# Patient Record
Sex: MALE | Race: WHITE | NOT HISPANIC OR LATINO | Employment: FULL TIME | URBAN - METROPOLITAN AREA
[De-identification: names, ages, dates, MRNs, and addresses within clinical notes are randomized per-mention and may not be internally consistent; named-entity substitution may affect disease eponyms.]

---

## 2017-02-20 ENCOUNTER — HOSPITAL ENCOUNTER (OUTPATIENT)
Dept: SLEEP MEDICINE | Facility: HOSPITAL | Age: 42
Discharge: HOME | End: 2017-02-20
Attending: INTERNAL MEDICINE | Admitting: INTERNAL MEDICINE

## 2017-02-20 PROCEDURE — G0463 HOSPITAL OUTPT CLINIC VISIT: HCPCS

## 2017-02-20 NOTE — PROGRESS NOTES
Sleep Disorder Follow Up    Patient Name: Ej Oscar Jr.  Age/Sex: 41 y.o. male  : 1975  MRN: [id number]    Date of Encounter Visit: 17  Referring Provider: Nate Larson Jr., MD  Place of Service: [facility]  Patient Care Team:  Nate Larson Jr., MD as PCP - General  Nate Larson Jr., MD as PCP - Family Medicine    PROBLEM LIST:  1. Severe TASHA on CPAP  2. Hyertension  3. Excessive daytime sleepiness  4. Morbid obesity  5. S/p hip replacement    History of Present Illness:  Ej Oscar Jr. is a 41 y.o. male who was last seen in the office on 16. He got new supplies after last visit and reports no complaints with machine or supplies.   The patient had a sleep study done prior that showed severe sleep apnea with an AHI of 50.8.   Patient is on CPAP and uses it most every night with no complaints from the mask or the pressure.  Patient sleeps better and has a deeper sleep with the CPAP and feels more energy during the day time.  Current CPAP setting 10 cmH20  ESS 4  Compliance download was reviewed and documented below  Weight is down 1 lb since last visit   Other comorbidities include HTN and obesity  Since last visit she has had a hip replacement on the right side in 2016. He was previously non-compliant with CPAP and mask. His blood pressure was elevated on today's visit, but reports his SBP is usually 120-130's at home. Re-check of blood pressure showed decrease to 150/80.     Review of Systems:     A ten-system review was conducted and was otherwise negative.   Please refer to the follow up sleep questionnaire page one for details.    Past Medical History:  Past Medical History   Diagnosis Date   • Arthritis    • Heartburn    • History of kidney stones    • Rash      LEFT CALF - TREATED BY PCP, USING TOPICAL MEDS DAILY   • Sleep apnea      WEARS CPAP       Past Surgical History   Procedure Laterality Date   • Pilonidal cystectomy     • Total  hip arthroplasty Right 11/18/2016     Procedure: RIGHT TOTAL HIP ARTHROPLASTY;  Surgeon: Debbie Caro MD;  Location: [facility] OR;  Service:        Home Medications:     Current Outpatient Prescriptions:   •  B Complex Vitamins (VITAMIN B COMPLEX) capsule capsule, Take 1 capsule by mouth Daily., Disp: , Rfl:   •  cetirizine (ZyrTEC) 10 MG tablet, Take 10 mg by mouth Every Morning., Disp: , Rfl:   •  Clotrimazole-Betamethasone (LOTRISONE EX), Apply  topically As Needed. LEFT CALF, Disp: , Rfl:   •  FIBER PO, Take 1 tablet by mouth Every Morning., Disp: , Rfl:   •  glucosamine-chondroitin 500-400 MG capsule capsule, Take 2 capsules by mouth Every Morning., Disp: , Rfl:   •  montelukast (SINGULAIR) 10 MG tablet, Take 10 mg by mouth Every Night., Disp: , Rfl:   •  Multiple Vitamins-Minerals (MULTIVITAMIN PO), Take 1 tablet by mouth daily., Disp: , Rfl:   •  POTASSIUM CHLORIDE ER PO, Take  by mouth Every Morning., Disp: , Rfl:     Allergies:  No Known Allergies    Past Social History:  Social History     Social History   • Marital status:      Spouse name: N/A   • Number of children: N/A   • Years of education: N/A     Social History Main Topics   • Smoking status: Former Smoker     Years: 1.00     Types: Cigars     Quit date: 2006   • Smokeless tobacco: Not on file   • Alcohol use Yes      Comment: RARE   • Drug use: No   • Sexual activity: Not on file     Other Topics Concern   • Not on file     Social History Narrative       Past Family History:  Family History   Problem Relation Age of Onset   • Heart disease Mother    • Hypertension Mother    • Depression Mother    • Hypertension Father    • Depression Brother    • Heart disease Maternal Grandmother    • Hypertension Maternal Grandmother    • Diabetes Maternal Grandmother    • Thyroid disease Maternal Grandmother    • Heart disease Maternal Grandfather    • Hypertension Maternal Grandfather    • Cancer Maternal Grandfather    • Hypertension  Paternal Grandmother    • Hypertension Paternal Grandfather    • Alcohol abuse Other      multple on both sides         Objective:   Done and documented on sleep disorders center physical examination sheet   VS: Ht: 73 inches, Wt: 366 lbs, BMI 48, /81 and recheck showed /80, HR 80, Neck size 21.75 inches    Physical Exam:   General: AAOx3. Normal mood and affect.   HEENT:  Mallampati 4 airways. Normal tongue and uvula. Normal tonsils.  LUNGS: Non-labored breathing. CTAB  HEART: regular rate and rhythm. No murmur. Normal s1/s2  EXTREMITIES: 1+ BLE edema. No cyanosis or clubbing. Normal gait    Diagnostic Data:  Sleep study- AHI 50.8    Compliance download showed 90% compliance with 6 hours and 6 minutes per night. Residual AHI of 2.2 on the CPAP at 10  with mean of 0.3 seconds of average air leak and 95% air leak of 3.9.     Assessment and Plan:   1. Severe TASHA- well controlled on CPAP with good control  2. HTN- not on any medications and noted as normal at prior visit. May be white coat syndrome  3. EDS that is better on CPAP  4. Morbid obesity with no sigificant changes  5. S/p R hip replacement       Recommendations:   Continue with CPAP at 10 cmH2o  Monitor blood pressure and follow up with PCP if continues to be elevated  Patient was educated in depth about TASHA and cardiovascular consequences if left untreated, including but not limited to CHF, CAD, arrhythmias, CVA, and/or HTN. Education also provided about the diagnostic tools for TASHA including the polysomnography and the treatment modalities including the CPAP. Adherence to the CPAP is a key factor in successful treatment of TASHA and the patient was encouraged to contact us in case of problem with the CPAP or the mask that can limit the tolerance of the compliance with the therapy.    Follow up in 1 year    MARY KATE Rios  [facility]   02/20/17  3:24 PM    EMR Dragon/Transcription disclaimer:   Much of this encounter note is  an electronic transcription/translation of spoken language to printed text. The electronic translation of spoken language may permit erroneous, or at times, nonsensical words or phrases to be inadvertently transcribed; Although I have reviewed the note for such errors, some may still exist.

## 2017-10-30 ENCOUNTER — OFFICE VISIT (OUTPATIENT)
Dept: FAMILY MEDICINE CLINIC | Facility: CLINIC | Age: 42
End: 2017-10-30

## 2017-10-30 VITALS
HEART RATE: 95 BPM | BODY MASS INDEX: 41.75 KG/M2 | HEIGHT: 73 IN | DIASTOLIC BLOOD PRESSURE: 80 MMHG | SYSTOLIC BLOOD PRESSURE: 150 MMHG | TEMPERATURE: 98.7 F | WEIGHT: 315 LBS | OXYGEN SATURATION: 98 %

## 2017-10-30 DIAGNOSIS — J30.1 NON-SEASONAL ALLERGIC RHINITIS DUE TO POLLEN, UNSPECIFIED CHRONICITY: Primary | ICD-10-CM

## 2017-10-30 PROCEDURE — 99213 OFFICE O/P EST LOW 20 MIN: CPT | Performed by: INTERNAL MEDICINE

## 2017-10-30 RX ORDER — MONTELUKAST SODIUM 10 MG/1
10 TABLET ORAL NIGHTLY
Qty: 90 TABLET | Refills: 3 | Status: SHIPPED | OUTPATIENT
Start: 2017-10-30 | End: 2018-11-05 | Stop reason: SDUPTHER

## 2017-10-30 NOTE — PROGRESS NOTES
Subjective   Ej Oscar Jr. is a 42 y.o. male.   Patient with drainage from throat most pronounced when he uses CPAP at night requests refill on his Singulair which is very effective  History of Present Illness   No other complaints this point in time significant seeing where refilled no other allergic/respiratory type complaints.  Patient is trying since of is doing better since he has hip surgery done.    Review of Systems   All other systems reviewed and are negative.      Objective   Vitals:    10/30/17 1515   BP: 150/80   Pulse: 95   Temp: 98.7 °F (37.1 °C)   SpO2: 98%   Weight: (!) 357 lb (162 kg)     Physical Exam   Constitutional: He appears well-developed and well-nourished.   Cardiovascular: Normal rate and regular rhythm.    Pulmonary/Chest: Effort normal and breath sounds normal.   Nursing note and vitals reviewed.      Lab Results   Component Value Date    INR 0.98 10/25/2016       Procedures    Assessment/Plan   Allergic rhinitis plan Singulair No. 90 with 3 refills, follow-up in one year and as needed    Much of this encounter note is an electronic transcription/translation of spoken language to printed text.  The electronic translation of spoken language may permit erroneous, or at times, nonsensical words or phrases to be inadvertently transcribed.  Although I have reviewed the note for such errors, some may still exist.

## 2018-02-19 ENCOUNTER — OFFICE VISIT (OUTPATIENT)
Dept: SLEEP MEDICINE | Facility: HOSPITAL | Age: 43
End: 2018-02-19
Attending: INTERNAL MEDICINE

## 2018-02-19 VITALS
WEIGHT: 315 LBS | BODY MASS INDEX: 44.1 KG/M2 | SYSTOLIC BLOOD PRESSURE: 158 MMHG | HEIGHT: 71 IN | DIASTOLIC BLOOD PRESSURE: 83 MMHG | HEART RATE: 78 BPM

## 2018-02-19 DIAGNOSIS — G47.33 OSA ON CPAP: Primary | ICD-10-CM

## 2018-02-19 DIAGNOSIS — Z99.89 OSA ON CPAP: Primary | ICD-10-CM

## 2018-02-19 PROCEDURE — G0463 HOSPITAL OUTPT CLINIC VISIT: HCPCS

## 2018-02-19 NOTE — PROGRESS NOTES
"[facility]         Dr Sherry Ortiz  [unfilled]  Patient Care Team:  Nate Larson Jr., MD as PCP - General  Nate Larson Jr., MD as PCP - Family Medicine    Chief Complaint: Or cervical myopathies of hypertension and obesity.  Original study showed AHI of 51 events per hour    Interval History: Patient here for follow-up.  Currently on CPAP 10 cm.  Compliance 93% with average daily use of 6 hours 27 minutes.  Feeds will benefit from CPAP.  No residual AHI a leak noted.  No alcohol abuse no caffeine abuse.  No tobacco abuse.  Currently has a nasal pillow with adequate fitting.    REVIEW OF SYSTEMS:   CARDIOVASCULAR: No chest pain, chest pressure or chest discomfort. No palpitations or edema.   RESPIRATORY: No shortness of breath,   GASTROINTESTINAL: No anorexia, nausea, vomiting or diarrhea. No abdominal pain or blood.   HEMATOLOGIC: No bleeding or bruising.   Positive for nasal congestion postnasal drainage cough and wheezing    Ventilator/Non-Invasive Ventilation Settings     None            Vital Signs  Heart Rate:  [78] 78  BP: (158)/(83) 158/83  [unfilled]  Flowsheet Rows         First Filed Value    Admission Height  180.3 cm (71\") Documented at 02/19/2018 1028    Admission Weight  (!)  158 kg (348 lb) Documented at 02/19/2018 1028          Physical Exam:   General Appearance:    Alert, cooperative, in no acute distress   Lungs:     Clear to auscultation,respirations regular, even and                  unlabored    Heart:    Regular rhythm and normal rate, normal S1 and S2, no            murmur, no gallop, no rub, no click   Chest Wall:    No abnormalities observed   Abdomen:     Normal bowel sounds, no masses, no organomegaly, soft        non-tender, non-distended, no guarding, no rebound                tenderness   Extremities:   Moves all extremities well, no edema, no cyanosis, no             redness     Results Review:                                          I reviewed the " patient's new clinical results.  I personally viewed and interpreted the patient's CXR        Medication Review:         No current facility-administered medications for this visit.     ASSESSMENT:   TASHA with comorbidities of obesity hypertension    PLAN:  Recommend CPAP 10 cm to be continued  Compliance AHI leak excellent  Patient having positive benefit from CPAP  Supplies renewed for 1 year  Follow-up in 1 year    Sherry Ortiz MD  02/19/18  5:38 PM

## 2018-11-05 ENCOUNTER — TELEPHONE (OUTPATIENT)
Dept: FAMILY MEDICINE CLINIC | Facility: CLINIC | Age: 43
End: 2018-11-05

## 2018-11-05 RX ORDER — MONTELUKAST SODIUM 10 MG/1
10 TABLET ORAL NIGHTLY
Qty: 90 TABLET | Refills: 0 | Status: SHIPPED | OUTPATIENT
Start: 2018-11-05 | End: 2018-12-10 | Stop reason: SDUPTHER

## 2018-11-05 NOTE — TELEPHONE ENCOUNTER
PT STATES HE TRIED TO CALL HIS PHARMACY FOR A REFILL ON SINGULAIR, BUT THEY INFORMED HIM THAT HE WOULD HAVE TO CALL US

## 2018-11-26 RX ORDER — MONTELUKAST SODIUM 10 MG/1
TABLET ORAL
Qty: 30 TABLET | Refills: 0 | Status: SHIPPED | OUTPATIENT
Start: 2018-11-26 | End: 2018-12-10 | Stop reason: SDUPTHER

## 2018-12-10 ENCOUNTER — OFFICE VISIT (OUTPATIENT)
Dept: FAMILY MEDICINE CLINIC | Facility: CLINIC | Age: 43
End: 2018-12-10

## 2018-12-10 VITALS
OXYGEN SATURATION: 98 % | DIASTOLIC BLOOD PRESSURE: 90 MMHG | HEIGHT: 71 IN | SYSTOLIC BLOOD PRESSURE: 150 MMHG | RESPIRATION RATE: 18 BRPM | HEART RATE: 78 BPM | WEIGHT: 315 LBS | BODY MASS INDEX: 44.1 KG/M2 | TEMPERATURE: 98 F

## 2018-12-10 DIAGNOSIS — I10 HYPERTENSION, ESSENTIAL: ICD-10-CM

## 2018-12-10 DIAGNOSIS — Z13.220 LIPID SCREENING: ICD-10-CM

## 2018-12-10 DIAGNOSIS — Z00.01 ENCOUNTER FOR ANNUAL GENERAL MEDICAL EXAMINATION WITH ABNORMAL FINDINGS IN ADULT: Primary | ICD-10-CM

## 2018-12-10 DIAGNOSIS — J30.1 ALLERGIC RHINITIS DUE TO POLLEN, UNSPECIFIED SEASONALITY: ICD-10-CM

## 2018-12-10 PROCEDURE — 99396 PREV VISIT EST AGE 40-64: CPT | Performed by: INTERNAL MEDICINE

## 2018-12-10 PROCEDURE — 99213 OFFICE O/P EST LOW 20 MIN: CPT | Performed by: INTERNAL MEDICINE

## 2018-12-10 RX ORDER — NIFEDIPINE 30 MG/1
30 TABLET, FILM COATED, EXTENDED RELEASE ORAL DAILY
Qty: 30 TABLET | Refills: 1 | Status: SHIPPED | OUTPATIENT
Start: 2018-12-10 | End: 2019-01-07

## 2018-12-10 RX ORDER — MONTELUKAST SODIUM 10 MG/1
10 TABLET ORAL NIGHTLY
Qty: 90 TABLET | Refills: 3 | Status: SHIPPED | OUTPATIENT
Start: 2018-12-10 | End: 2019-12-30

## 2018-12-10 NOTE — PROGRESS NOTES
Subjective   Ej Oscar Jr. is a 43 y.o. male.  Encounter for annual adult general medical evaluation also needs physical for adoption purposes.  Needs physical bp running high  History of Present Illness annual general medical history and physical basis for adoption physical purposes also has DOT physical showing blood pressure be elevated family history for hypertension no personal history is been running high lately 160 or so systolic readings today are 150/90 no headache type complaints needs lipids checked as well.  Does have allergies needs refill on medications for these.  bp elev      Review of Systems   All other systems reviewed and are negative.      Objective   Physical Exam   Constitutional: He appears well-developed and well-nourished.   HENT:   Head: Normocephalic and atraumatic.   Eyes: Conjunctivae are normal. Pupils are equal, round, and reactive to light.   Neck:   No carotid bruits left right carotid pulses are 2+   Cardiovascular: Normal rate, regular rhythm and normal heart sounds.   Pulmonary/Chest: Effort normal and breath sounds normal.   Abdominal: Soft. Bowel sounds are normal.   No bruits noted over abdomen or flank   Musculoskeletal:   Left and right radial, femoral, posterior tibial pulses are all 2+   Neurological: He is alert.   Unremarkable gait and station   Skin: Skin is warm.   Nursing note and vitals reviewed.        Assessment/Plan 1.  Encounter for annual general medical and history examination     2.  Hypertension onset plan await pending lab recheck in one month addition be in nifedipine CC 30 mg daily call blood pressure readings one week  Anticipate getting EKG and chest x-ray on return in one month's time  3.  Lipid screening await pending lab    4.  Allergic rhinitis refill medications for same follow-up in one year and as needed    Much of this encounter note is an electronic transcription/translation of spoken language to printed text.  The electronic translation of  spoken language may permit erroneous, or at times, nonsensical words or phrases to be inadvertently transcribed.  Although I have reviewed the note for such errors, some may still exist. If there are questions or for further clarification, please contact me.

## 2019-01-07 ENCOUNTER — OFFICE VISIT (OUTPATIENT)
Dept: FAMILY MEDICINE CLINIC | Facility: CLINIC | Age: 44
End: 2019-01-07

## 2019-01-07 VITALS
OXYGEN SATURATION: 97 % | HEIGHT: 71 IN | DIASTOLIC BLOOD PRESSURE: 86 MMHG | TEMPERATURE: 98.5 F | SYSTOLIC BLOOD PRESSURE: 172 MMHG | HEART RATE: 96 BPM | WEIGHT: 315 LBS | BODY MASS INDEX: 44.1 KG/M2

## 2019-01-07 DIAGNOSIS — M79.10 MYALGIA: ICD-10-CM

## 2019-01-07 DIAGNOSIS — R79.89 ABNORMAL TSH: Primary | ICD-10-CM

## 2019-01-07 DIAGNOSIS — I10 HYPERTENSION, ESSENTIAL: Primary | ICD-10-CM

## 2019-01-07 DIAGNOSIS — D64.9 ANEMIA, UNSPECIFIED TYPE: ICD-10-CM

## 2019-01-07 DIAGNOSIS — Z13.220 LIPID SCREENING: ICD-10-CM

## 2019-01-07 DIAGNOSIS — J02.9 ACUTE PHARYNGITIS, UNSPECIFIED ETIOLOGY: ICD-10-CM

## 2019-01-07 LAB
ALBUMIN SERPL-MCNC: 3.9 G/DL (ref 3.5–5.2)
ALBUMIN/GLOB SERPL: 0.9 G/DL
ALP SERPL-CCNC: 64 U/L (ref 39–117)
ALT SERPL W P-5'-P-CCNC: 32 U/L (ref 1–41)
ANION GAP SERPL CALCULATED.3IONS-SCNC: 10.2 MMOL/L
AST SERPL-CCNC: 24 U/L (ref 1–40)
BACTERIA UR QL AUTO: NORMAL /HPF
BILIRUB SERPL-MCNC: 0.3 MG/DL (ref 0.1–1.2)
BILIRUB UR QL STRIP: NEGATIVE
BUN BLD-MCNC: 12 MG/DL (ref 6–20)
BUN/CREAT SERPL: 12.9 (ref 7–25)
CALCIUM SPEC-SCNC: 9.3 MG/DL (ref 8.6–10.5)
CHLORIDE SERPL-SCNC: 100 MMOL/L (ref 98–107)
CHOLEST SERPL-MCNC: 141 MG/DL (ref 0–200)
CLARITY UR: CLEAR
CO2 SERPL-SCNC: 26.8 MMOL/L (ref 22–29)
COLOR UR: YELLOW
CREAT BLD-MCNC: 0.93 MG/DL (ref 0.76–1.27)
ERYTHROCYTE [DISTWIDTH] IN BLOOD BY AUTOMATED COUNT: 14.7 % (ref 4.5–15)
FLUAV AG NPH QL: NEGATIVE
FLUBV AG NPH QL IA: NEGATIVE
GFR SERPL CREATININE-BSD FRML MDRD: 89 ML/MIN/1.73
GLOBULIN UR ELPH-MCNC: 4.3 GM/DL
GLUCOSE BLD-MCNC: 126 MG/DL (ref 65–99)
GLUCOSE UR STRIP-MCNC: NEGATIVE MG/DL
HCT VFR BLD AUTO: 45.2 % (ref 35–60)
HDLC SERPL-MCNC: 32 MG/DL (ref 40–60)
HGB BLD-MCNC: 14.5 G/DL (ref 13.5–18)
HGB UR QL STRIP.AUTO: NEGATIVE
KETONES UR QL STRIP: NEGATIVE
LDLC SERPL CALC-MCNC: 62 MG/DL (ref 0–100)
LDLC/HDLC SERPL: 1.94 {RATIO}
LEUKOCYTE ESTERASE UR QL STRIP.AUTO: NEGATIVE
LYMPHOCYTES # BLD AUTO: 2.6 10*3/MM3 (ref 1.2–3.4)
LYMPHOCYTES NFR BLD AUTO: 25.6 % (ref 21–51)
MCH RBC QN AUTO: 29.1 PG (ref 26.1–33.1)
MCHC RBC AUTO-ENTMCNC: 32.2 G/DL (ref 33–37)
MCV RBC AUTO: 90.2 FL (ref 80–99)
MONOCYTES # BLD AUTO: 0.7 10*3/MM3 (ref 0.1–0.6)
MONOCYTES NFR BLD AUTO: 7.2 % (ref 2–9)
NEUTROPHILS # BLD AUTO: 6.9 10*3/MM3 (ref 1.4–6.5)
NEUTROPHILS NFR BLD AUTO: 67.2 % (ref 42–75)
NITRITE UR QL STRIP: NEGATIVE
PH UR STRIP.AUTO: 6.5 [PH] (ref 4.6–8)
PLATELET # BLD AUTO: 234 10*3/MM3 (ref 150–450)
PMV BLD AUTO: 7.9 FL (ref 7.1–10.5)
POTASSIUM BLD-SCNC: 3.5 MMOL/L (ref 3.5–5.2)
PROT SERPL-MCNC: 8.2 G/DL (ref 6–8.5)
PROT UR QL STRIP: NEGATIVE
RBC # BLD AUTO: 5.01 10*6/MM3 (ref 4–6)
RBC # UR: NORMAL /HPF
REF LAB TEST METHOD: NORMAL
SODIUM BLD-SCNC: 137 MMOL/L (ref 136–145)
SP GR UR STRIP: 1.02 (ref 1–1.03)
SQUAMOUS #/AREA URNS HPF: NORMAL /HPF
T4 FREE SERPL-MCNC: 0.89 NG/DL (ref 0.93–1.7)
TRIGL SERPL-MCNC: 235 MG/DL (ref 0–150)
TSH SERPL DL<=0.05 MIU/L-ACNC: 9.02 MIU/ML (ref 0.27–4.2)
UROBILINOGEN UR QL STRIP: NORMAL
VLDLC SERPL-MCNC: 47 MG/DL (ref 5–40)
WBC NRBC COR # BLD: 10.3 10*3/MM3 (ref 4.5–10)
WBC UR QL AUTO: NORMAL /HPF

## 2019-01-07 PROCEDURE — 84481 FREE ASSAY (FT-3): CPT | Performed by: INTERNAL MEDICINE

## 2019-01-07 PROCEDURE — 85025 COMPLETE CBC W/AUTO DIFF WBC: CPT | Performed by: INTERNAL MEDICINE

## 2019-01-07 PROCEDURE — 99214 OFFICE O/P EST MOD 30 MIN: CPT | Performed by: INTERNAL MEDICINE

## 2019-01-07 PROCEDURE — 80061 LIPID PANEL: CPT | Performed by: INTERNAL MEDICINE

## 2019-01-07 PROCEDURE — 80053 COMPREHEN METABOLIC PANEL: CPT | Performed by: INTERNAL MEDICINE

## 2019-01-07 PROCEDURE — 84439 ASSAY OF FREE THYROXINE: CPT | Performed by: INTERNAL MEDICINE

## 2019-01-07 PROCEDURE — 87804 INFLUENZA ASSAY W/OPTIC: CPT | Performed by: INTERNAL MEDICINE

## 2019-01-07 PROCEDURE — 81001 URINALYSIS AUTO W/SCOPE: CPT | Performed by: INTERNAL MEDICINE

## 2019-01-07 PROCEDURE — 84443 ASSAY THYROID STIM HORMONE: CPT | Performed by: INTERNAL MEDICINE

## 2019-01-07 PROCEDURE — 36415 COLL VENOUS BLD VENIPUNCTURE: CPT | Performed by: INTERNAL MEDICINE

## 2019-01-07 RX ORDER — NIFEDIPINE 30 MG/1
TABLET, FILM COATED, EXTENDED RELEASE ORAL
Qty: 60 TABLET | Refills: 0 | Status: SHIPPED | OUTPATIENT
Start: 2019-01-07 | End: 2019-02-13 | Stop reason: SDUPTHER

## 2019-01-07 RX ORDER — NIFEDIPINE 30 MG/1
TABLET, FILM COATED, EXTENDED RELEASE ORAL
Qty: 60 TABLET | Refills: 0 | Status: SHIPPED | OUTPATIENT
Start: 2019-01-07 | End: 2019-01-07 | Stop reason: SDUPTHER

## 2019-01-07 NOTE — PROGRESS NOTES
Subjective   Ej Oscar Jr. is a 43 y.o. male.   Follow-up on blood pressure, also having sore throat some sinus drainage and myalgias  History of Present Illness   Follow-up on blood pressure still slightly elevated 172/86 patient is on nifedipine ER 30 mg daily without side effects we will increase that to 2 tablets a day and have him call blood pressure readings on Thursday.  Regarding the history on his sore throat for several days time or scratchy but is comfortable with that no reported temperature no known strep exposures.  Sinuses draining mildly also lungs without significant cough.  He is having some muscle aches more than usual but no reported temperature.  We will get a flu swab also regarding follow-up lab regarding anemia as well as lipid screening today.  Family history for hypertension, heart disease, depression also EtOH abuse.  Patient is a prior smoker quit in 1995 some EtOH.  No reported drug allergies.  Review of Systems   HENT: Positive for sore throat.    Respiratory: Positive for cough.    All other systems reviewed and are negative.      Objective   Vitals:    01/07/19 1047   BP: 172/86   Pulse: 96   Temp: 98.5 °F (36.9 °C)   SpO2: 97%   Weight: (!) 169 kg (371 lb 9.6 oz)     Physical Exam   Constitutional: He appears well-developed and well-nourished.   HENT:   Head: Normocephalic and atraumatic.   Right Ear: External ear normal.   Left Ear: External ear normal.   Throat with mild inflammation but no exudate   Eyes: Conjunctivae are normal. Pupils are equal, round, and reactive to light.   Cardiovascular: Normal rate, regular rhythm and normal heart sounds.   Pulmonary/Chest: Effort normal and breath sounds normal.   Abdominal: Soft. Bowel sounds are normal.   Neurological: He is alert.   Unremarkable gait and station   Skin: Skin is warm and dry.   Nursing note and vitals reviewed.      Lab Results   Component Value Date    INR 0.98 10/25/2016       Procedures    Assessment/Plan     .   Hypertension suboptimally controlled plan increase nifedipine ER to 30 mg 2 tablets daily call blood pressure readings in 3 days time further follow-up tentatively in 1 month time    2.  Pharyngitis acute plan Z-Eduardo for this  Follow-up or cough not well in 10 days' time  3.  Myalgias monitor for now negative flu swab    4.  Anemia status post hip replacement we will get updated values    5.  Lipid screening plan await lab work for further disposition    Much of this encounter note is an electronic transcription/translation of spoken language to printed text.  The electronic translation of spoken language may permit erroneous, or at times, nonsensical words or phrases to be inadvertently transcribed.  Although I have reviewed the note for such errors, some may still exist. If there are questions or for further clarification, please contact me.

## 2019-01-08 DIAGNOSIS — R79.89 ABNORMAL TSH: Primary | ICD-10-CM

## 2019-01-08 LAB — T3FREE SERPL-MCNC: 3.22 PG/ML (ref 2–4.4)

## 2019-01-08 RX ORDER — LEVOTHYROXINE SODIUM 0.03 MG/1
25 TABLET ORAL DAILY
Qty: 30 TABLET | Refills: 1 | Status: SHIPPED | OUTPATIENT
Start: 2019-01-08 | End: 2019-02-27 | Stop reason: SDUPTHER

## 2019-01-08 RX ORDER — AZITHROMYCIN 250 MG/1
TABLET, FILM COATED ORAL
Qty: 6 TABLET | Refills: 0 | Status: SHIPPED | OUTPATIENT
Start: 2019-01-08 | End: 2020-10-05

## 2019-01-25 ENCOUNTER — HOSPITAL ENCOUNTER (OUTPATIENT)
Dept: ULTRASOUND IMAGING | Facility: HOSPITAL | Age: 44
Discharge: HOME | End: 2019-01-25
Attending: INTERNAL MEDICINE | Admitting: INTERNAL MEDICINE

## 2019-01-25 PROCEDURE — 76536 US EXAM OF HEAD AND NECK: CPT

## 2019-02-13 RX ORDER — NIFEDIPINE 30 MG/1
TABLET, FILM COATED, EXTENDED RELEASE ORAL
Qty: 180 TABLET | Refills: 3 | Status: SHIPPED | OUTPATIENT
Start: 2019-02-13 | End: 2020-10-05

## 2019-02-13 RX ORDER — NIFEDIPINE 30 MG/1
TABLET, FILM COATED, EXTENDED RELEASE ORAL
Qty: 180 TABLET | Refills: 3 | Status: SHIPPED | OUTPATIENT
Start: 2019-02-13 | End: 2019-02-13 | Stop reason: SDUPTHER

## 2019-02-15 RX ORDER — NIFEDIPINE 30 MG/1
TABLET, FILM COATED, EXTENDED RELEASE ORAL
Qty: 60 TABLET | Refills: 0 | Status: SHIPPED | OUTPATIENT
Start: 2019-02-15 | End: 2020-10-05 | Stop reason: SDUPTHER

## 2019-02-27 RX ORDER — LEVOTHYROXINE SODIUM 0.03 MG/1
25 TABLET ORAL DAILY
Qty: 90 TABLET | Refills: 1 | Status: SHIPPED | OUTPATIENT
Start: 2019-02-27 | End: 2019-08-31 | Stop reason: SDUPTHER

## 2019-04-15 ENCOUNTER — OFFICE VISIT (OUTPATIENT)
Dept: SLEEP MEDICINE | Facility: HOSPITAL | Age: 44
End: 2019-04-15
Attending: INTERNAL MEDICINE

## 2019-04-15 VITALS
BODY MASS INDEX: 42.66 KG/M2 | WEIGHT: 315 LBS | DIASTOLIC BLOOD PRESSURE: 82 MMHG | SYSTOLIC BLOOD PRESSURE: 140 MMHG | HEIGHT: 72 IN | HEART RATE: 79 BPM

## 2019-04-15 DIAGNOSIS — E03.9 HYPOTHYROIDISM (ACQUIRED): ICD-10-CM

## 2019-04-15 DIAGNOSIS — Z99.89 OSA ON CPAP: Primary | ICD-10-CM

## 2019-04-15 DIAGNOSIS — G47.33 OSA ON CPAP: Primary | ICD-10-CM

## 2019-04-15 DIAGNOSIS — E66.01 OBESITY, MORBID, BMI 50 OR HIGHER (HCC): ICD-10-CM

## 2019-04-15 DIAGNOSIS — G47.10 HYPERSOMNIA WITH SLEEP APNEA: ICD-10-CM

## 2019-04-15 DIAGNOSIS — I10 BENIGN ESSENTIAL HTN: ICD-10-CM

## 2019-04-15 DIAGNOSIS — G47.30 HYPERSOMNIA WITH SLEEP APNEA: ICD-10-CM

## 2019-04-15 PROCEDURE — G0463 HOSPITAL OUTPT CLINIC VISIT: HCPCS

## 2019-04-15 NOTE — PROGRESS NOTES
Sleep Disorder Follow Up    Patient Name: Ej Oscar Jr.  Age/Sex: 43 y.o. male  : 1975  MRN: [id number]    Date of Encounter Visit: 04/15/19    Referring Provider: Sadiq Garduno MD  Place of Service: [facility]  Patient Care Team:  Nate Larson Jr., MD as PCP - General  Nate Larson Jr., MD as PCP - Family Medicine    PROBLEM LIST:  1. Severe TASHA on CPAP  2. Hyertension  3. Excessive daytime sleepiness  4. Morbid obesity  5. S/p hip replacement  6. Hypothyroidism           History of Present Illness:  Ej Oscar Jr. is a 43 y.o. male who is here for follow up on TASHA . Patient was last seen in the office on 2018 by Dr Ortiz, I last saw him on 2017.    The patient had a sleep study done prior that showed severe sleep apnea with an AHI of 50.8.   Patient is on CPAP and uses it most every night with no complaints from the mask or the pressure.  Patient sleeps better and has a deeper sleep with the CPAP and feels more energy during the day time.  Current CPAP setting 10 cmH20  His CPAP provider is [facility] in [place]  ESS 4  Compliance download was reviewed and documented below  Weight is up by 25 lb since last visit   Other comorbidities include HTN and obesity, hypothyroid  Since last visit she has had a hip replacement on the right side in 2016. He was gaining a lot of weight and was diagnosed with hypothyroidism and was started on hormonal replacement, otherwise no changes since last visit.        Review of Systems:   A ten-system review was conducted and was negative except for nasal congestion .   Please refer to the follow up sleep questionnaire page one for details.    Past Medical History:  Past medical, surgical, social, and family history, except as mentioned above, was unchanged from the last visit.     Past Medical History:   Diagnosis Date   • Arthritis    • Heartburn    • History of kidney stones    • Hypertension    •  Rash     LEFT CALF - TREATED BY PCP, USING TOPICAL MEDS DAILY   • Sleep apnea     WEARS CPAP       Past Surgical History:   Procedure Laterality Date   • PILONIDAL CYSTECTOMY     • TOTAL HIP ARTHROPLASTY Right 2016    Procedure: RIGHT TOTAL HIP ARTHROPLASTY;  Surgeon: Debbie Caro MD;  Location: [facility] OR;  Service:        Home Medications:     Current Outpatient Medications:   •  azithromycin (ZITHROMAX) 250 MG tablet, Take 2 tablets the first day, then 1 tablet daily for 4 days., Disp: 6 tablet, Rfl: 0  •  B Complex Vitamins (VITAMIN B COMPLEX) capsule capsule, Take 1 capsule by mouth Daily., Disp: , Rfl:   •  cetirizine (ZyrTEC) 10 MG tablet, Take 10 mg by mouth Every Morning., Disp: , Rfl:   •  FIBER PO, Take 1 tablet by mouth Every Morning., Disp: , Rfl:   •  glucosamine-chondroitin 500-400 MG capsule capsule, Take 2 capsules by mouth Every Morning., Disp: , Rfl:   •  levothyroxine (SYNTHROID) 25 MCG tablet, Take 1 tablet by mouth Daily., Disp: 90 tablet, Rfl: 1  •  montelukast (SINGULAIR) 10 MG tablet, Take 1 tablet by mouth Every Night., Disp: 90 tablet, Rfl: 3  •  Multiple Vitamins-Minerals (MULTIVITAMIN PO), Take 1 tablet by mouth daily., Disp: , Rfl:   •  NIFEdipine CC (ADALAT CC) 30 MG 24 hr tablet, 2 tablets daily, Disp: 180 tablet, Rfl: 3  •  NIFEdipine CC (ADALAT CC) 30 MG 24 hr tablet, TAKE 2 TABLETS BY MOUTH EVERY DAY, Disp: 60 tablet, Rfl: 0  •  NON FORMULARY, tumeric, Disp: , Rfl:   •  POTASSIUM CHLORIDE ER PO, Take  by mouth Every Morning., Disp: , Rfl:     Allergies:  No Known Allergies    Past Social History:  Social History     Socioeconomic History   • Marital status:      Spouse name: Not on file   • Number of children: Not on file   • Years of education: Not on file   • Highest education level: Not on file   Tobacco Use   • Smoking status: Former Smoker     Years: 1.00     Types: Cigars     Last attempt to quit:      Years since quittin.3   • Smokeless  "tobacco: Never Used   • Tobacco comment: only smoked for 1 year   Substance and Sexual Activity   • Alcohol use: Yes     Comment: RARE   • Drug use: No       Past Family History:  Family History   Problem Relation Age of Onset   • Heart disease Mother    • Hypertension Mother    • Depression Mother    • Hypertension Father    • Depression Brother    • Heart disease Maternal Grandmother    • Hypertension Maternal Grandmother    • Diabetes Maternal Grandmother    • Thyroid disease Maternal Grandmother    • Heart disease Maternal Grandfather    • Hypertension Maternal Grandfather    • Cancer Maternal Grandfather    • Hypertension Paternal Grandmother    • Hypertension Paternal Grandfather    • Alcohol abuse Other         multple on both sides         Objective:   Done and documented on sleep disorders center physical examination sheet, please refer to hand written note on the chart for details about the other pertinent negative findings.    Vital Signs:   Visit Vitals  /82   Pulse 79   Ht 182.9 cm (72\")   Wt (!) 169 kg (373 lb)   BMI 50.59 kg/m²     Wt Readings from Last 3 Encounters:   04/15/19 (!) 169 kg (373 lb)   01/07/19 (!) 169 kg (371 lb 9.6 oz)   12/10/18 (!) 167 kg (368 lb)          Physical Exam:   General: AAOx3. Normal mood and affect.   HEENT:  Mallampati 4 airways. Normal tongue and uvula. Normal tonsils.  LUNGS: Non-labored breathing. CTAB  HEART: regular rate and rhythm. No murmur. Normal s1/s2  EXTREMITIES: 2+ BLE edema, slightly worse on the right . No cyanosis or clubbing. Normal gait        Diagnostic Data:  Sleep study- AHI 50.8     Compliance download from 1/15/2019-4/14/2019  showed 99%, with average nightly use of 6 hours and 32 min, residual AHI 1.8, on CPAP of 10, with minimal air leak, median of 0.8 lpm and 95% of 2.0 lpm.      Assessment and Plan:       ICD-10-CM ICD-9-CM   1. TASHA on CPAP G47.33 327.23    Z99.89 V46.8   2. Hypersomnia with sleep apnea G47.10 780.53    G47.30    3. " Obesity, morbid, BMI 50 or higher (CMS/[facility]) E66.01 278.01   4. Benign essential HTN I10 401.1   5. Hypothyroidism (acquired) E03.9 244.9       Recommendations:     Patient is compliant with the CPAP at the above setting, he is having good control of the underlying sleep apnea, he does have both clinical and subjective benefits from it and it is recommended to be continued  Patient had significant weight gain however he is not far of from his prior weights on a prior visits,His sleep apnea is considered well controlled on that setting and no adjustments are needed for that.  He was diagnosed with hypothyroidism which probably contributed to the weight gain and he is already on hormonal replacement and followed by primary care physician.  His blood pressure is currently controlled on today's visit on his current regimen.  Patient was advised to continue with the CPAP and to work on the weight loss, will continue to follow up on a yearly basis or sooner as needed    No orders of the defined types were placed in this encounter.    No orders of the defined types were placed in this encounter.    Return in about 1 year (around 4/15/2020).    Sadiq Garduno MD   [facility]   04/15/19  9:35 AM    Dictated utilizing Dragon dictation

## 2019-09-03 RX ORDER — LEVOTHYROXINE SODIUM 0.03 MG/1
TABLET ORAL
Qty: 90 TABLET | Refills: 1 | Status: SHIPPED | OUTPATIENT
Start: 2019-09-03 | End: 2020-02-27

## 2019-12-30 RX ORDER — MONTELUKAST SODIUM 10 MG/1
TABLET ORAL
Qty: 90 TABLET | Refills: 3 | Status: SHIPPED | OUTPATIENT
Start: 2019-12-30 | End: 2021-01-04

## 2020-02-27 RX ORDER — LEVOTHYROXINE SODIUM 0.03 MG/1
TABLET ORAL
Qty: 90 TABLET | Refills: 1 | Status: SHIPPED | OUTPATIENT
Start: 2020-02-27 | End: 2020-08-26

## 2020-03-24 RX ORDER — NIFEDIPINE 30 MG/1
TABLET, FILM COATED, EXTENDED RELEASE ORAL
Qty: 180 TABLET | Refills: 0 | Status: SHIPPED | OUTPATIENT
Start: 2020-03-24 | End: 2020-07-14

## 2020-07-07 ENCOUNTER — OFFICE VISIT (OUTPATIENT)
Dept: SLEEP MEDICINE | Facility: HOSPITAL | Age: 45
End: 2020-07-07

## 2020-07-07 VITALS
SYSTOLIC BLOOD PRESSURE: 166 MMHG | DIASTOLIC BLOOD PRESSURE: 86 MMHG | BODY MASS INDEX: 42.66 KG/M2 | HEIGHT: 72 IN | WEIGHT: 315 LBS | OXYGEN SATURATION: 96 % | HEART RATE: 116 BPM

## 2020-07-07 DIAGNOSIS — G47.33 OBSTRUCTIVE SLEEP APNEA, ADULT: Primary | ICD-10-CM

## 2020-07-07 PROCEDURE — G0463 HOSPITAL OUTPT CLINIC VISIT: HCPCS

## 2020-07-07 NOTE — PROGRESS NOTES
[facility]                          Chief Complaint:   Follow up for obstructive sleep apnea, comorbid obesity and HTN.    History of present illness:   Subjective      This is a 45 year old male patient . He used to follow with Dr. REYES. He transferred care to me. All his problems are new to me.     TASHA:  Diagnosed in 2015. AHI= 50.  No issues with his CPAP.     Sleep schedule:  -Bedtime: Midnight  -Sleep latency: Not too long  -Wake up time: 10 AM , does feel refreshed  -Nocturnal awakenin times because of unirnate.  No difficulties going back to sleep.  -Perceived sleep hours: 6.5-7 hour    Mask: Nasal which does fit well.  No significant air leak or dry mouth  DME: [employer]    ESS: Total score: 3     Obesity:  He gained 20 Lb since last year. He attributed that to the coronavirus pandemic.  Wife is diabetic and he's adjusting his diet according her. No exercise other than work and mowing grass.     HTN:  He takes nifedipine.  He denies side effects.  Reported compliance with the medication.  He stated that his blood pressures well-regulated.    REVIEW OF SYSTEMS:   HEENT: Nasal congestion and postnasal drip   CARDIOVASCULAR: No chest pain, chest pressure or chest discomfort. No palpitations or edema.   RESPIRATORY: No shortness of breath, cough or sputum.   GASTROINTESTINAL: No abdominal bloating or reflux   NEUROLOGICAL/PSYCHOATRY: No depression nor anxiety    Past Medical History:  Past Medical History:   Diagnosis Date   • Arthritis    • Heartburn    • History of kidney stones    • Hypertension    • Rash     LEFT CALF - TREATED BY PCP, USING TOPICAL MEDS DAILY   • Sleep apnea     WEARS CPAP   ,   Past Surgical History:   Procedure Laterality Date   • PILONIDAL CYSTECTOMY     • TOTAL HIP ARTHROPLASTY Right 2016    Procedure: RIGHT TOTAL HIP ARTHROPLASTY;  Surgeon: Debbie Caro MD;  Location: [facility];  Service:     ,   Family History   Problem Relation Age of Onset   • Heart disease Mother    • Hypertension Mother    • Depression Mother    • Hypertension Father    • Depression Brother    • Heart disease Maternal Grandmother    • Hypertension Maternal Grandmother    • Diabetes Maternal Grandmother    • Thyroid disease Maternal Grandmother    • Heart disease Maternal Grandfather    • Hypertension Maternal Grandfather    • Cancer Maternal Grandfather    • Hypertension Paternal Grandmother    • Hypertension Paternal Grandfather    • Alcohol abuse Other         multple on both sides   ,   Social History     Tobacco Use   • Smoking status: Former Smoker     Years: 1.00     Types: Cigars     Last attempt to quit:      Years since quittin.5   • Smokeless tobacco: Never Used   • Tobacco comment: only smoked for 1 year   Substance Use Topics   • Alcohol use: Yes     Comment: RARE   • Drug use: No    and Allergies:  Patient has no known allergies.    Medication Review:     Current Outpatient Medications:   •  azithromycin (ZITHROMAX) 250 MG tablet, Take 2 tablets the first day, then 1 tablet daily for 4 days., Disp: 6 tablet, Rfl: 0  •  B Complex Vitamins (VITAMIN B COMPLEX) capsule capsule, Take 1 capsule by mouth Daily., Disp: , Rfl:   •  cetirizine (ZyrTEC) 10 MG tablet, Take 10 mg by mouth Every Morning., Disp: , Rfl:   •  FIBER PO, Take 1 tablet by mouth Every Morning., Disp: , Rfl:   •  glucosamine-chondroitin 500-400 MG capsule capsule, Take 2 capsules by mouth Every Morning., Disp: , Rfl:   •  levothyroxine (SYNTHROID, LEVOTHROID) 25 MCG tablet, TAKE 1 TABLET BY MOUTH EVERY DAY, Disp: 90 tablet, Rfl: 1  •  montelukast (SINGULAIR) 10 MG tablet, TAKE 1 TABLET EVERY NIGHT, Disp: 90 tablet, Rfl: 3  •  Multiple Vitamins-Minerals (MULTIVITAMIN PO), Take 1 tablet by mouth daily., Disp: , Rfl:   •  NIFEdipine CC (ADALAT CC) 30 MG 24 hr tablet, 2 tablets daily, Disp: 180 tablet, Rfl: 3  •  NIFEdipine CC (ADALAT CC) 30 MG 24 hr  "tablet, TAKE 2 TABLETS BY MOUTH EVERY DAY, Disp: 60 tablet, Rfl: 0  •  NIFEdipine CC (ADALAT CC) 30 MG 24 hr tablet, TAKE 2 TABLETS DAILY, Disp: 180 tablet, Rfl: 0  •  NON FORMULARY, tumeric, Disp: , Rfl:   •  POTASSIUM CHLORIDE ER PO, Take  by mouth Every Morning., Disp: , Rfl:       Objective   Vital Signs:  Vitals:    07/07/20 1444   BP: 166/86   Pulse: 116   SpO2: 96%   Weight: (!) 178 kg (393 lb)   Height: 182.9 cm (72\")     Body mass index is 53.3 kg/m².          Physical Exam:   General Appearance:    Alert, cooperative, in no acute distress   ENMT:  Freidman and Mallampati score 4, narrow distance in between the posterior pharyngeal pillars.  Mild scalloped tongue   Neck:  Large. Trachea midline. No thyromegaly.   Lungs:     Clear to auscultation,respirations regular, even and                  unlabored    Heart:    Regular rhythm and normal rate, normal S1 and S2, no            Murmur.   Abdomen:     Obese.  Soft.  No tenderness.  No HSM    Integumentary:  No rash.  Red skin in lower legs with mild excoriations.   Neuro:   Conscious, alert, oriented x3. Appropriate mood and affect.  Gait is normal.  Strength 5/5 in arms and legs   Extremities:   Moves all extremities well.  +3 edema in legs.  Warm extremities and well-perfused              Diagnostic data:  CPAP download showed:  Date: Last 90 days  Usage (days): 100 %  Days used>4h: 100 %  AHI: 2.1/h  Leak 95%: 1.9  Usage: 6 h and 43 min  CPAP: 10 cm H2O    Assessment   1. Severe TASHA, on CPAP 10  2. Morbid obesity (BMI = 53)  3. Essential hypertension    All problems new to me    PLAN:  Discussed the result of the download.   Patient is compliant with therapy and clinically benefit from treatment.  Patient was encouraged to continue using his CPAP.  Refill supplies.    Counseled for weight loss.  Encouraged to exercise regularly and cut down on carbohydrates.  Discussed that losing weight may decrease the severity of sleep apnea and obviate the need of " CPAP therapy.    Continue BP meds.  Discussed the importance of Pap compliance in the setting of HTN.                This note was dictated utilizing Dragon dictation

## 2020-07-14 RX ORDER — NIFEDIPINE 30 MG/1
TABLET, FILM COATED, EXTENDED RELEASE ORAL
Qty: 180 TABLET | Refills: 0 | Status: SHIPPED | OUTPATIENT
Start: 2020-07-14 | End: 2020-10-05 | Stop reason: SDUPTHER

## 2020-08-26 RX ORDER — LEVOTHYROXINE SODIUM 0.03 MG/1
TABLET ORAL
Qty: 90 TABLET | Refills: 1 | Status: SHIPPED | OUTPATIENT
Start: 2020-08-26 | End: 2020-10-05

## 2020-10-05 ENCOUNTER — OFFICE VISIT (OUTPATIENT)
Dept: FAMILY MEDICINE CLINIC | Facility: CLINIC | Age: 45
End: 2020-10-05

## 2020-10-05 VITALS
HEART RATE: 66 BPM | WEIGHT: 315 LBS | OXYGEN SATURATION: 98 % | HEIGHT: 72 IN | TEMPERATURE: 99.1 F | DIASTOLIC BLOOD PRESSURE: 74 MMHG | SYSTOLIC BLOOD PRESSURE: 156 MMHG | BODY MASS INDEX: 42.66 KG/M2

## 2020-10-05 DIAGNOSIS — E78.5 HYPERLIPIDEMIA, UNSPECIFIED HYPERLIPIDEMIA TYPE: ICD-10-CM

## 2020-10-05 DIAGNOSIS — I10 HYPERTENSION, ESSENTIAL: Primary | ICD-10-CM

## 2020-10-05 DIAGNOSIS — E03.9 HYPOTHYROIDISM, UNSPECIFIED TYPE: ICD-10-CM

## 2020-10-05 LAB
ALBUMIN SERPL-MCNC: 4.2 G/DL (ref 3.5–5.2)
ALBUMIN/GLOB SERPL: 1.1 G/DL
ALP SERPL-CCNC: 72 U/L (ref 39–117)
ALT SERPL W P-5'-P-CCNC: 48 U/L (ref 1–41)
ANION GAP SERPL CALCULATED.3IONS-SCNC: 12.3 MMOL/L (ref 5–15)
ARTICHOKE IGE QN: 54 MG/DL (ref 0–100)
AST SERPL-CCNC: 34 U/L (ref 1–40)
BILIRUB SERPL-MCNC: 0.3 MG/DL (ref 0–1.2)
BUN SERPL-MCNC: 13 MG/DL (ref 6–20)
BUN/CREAT SERPL: 14 (ref 7–25)
CALCIUM SPEC-SCNC: 9.4 MG/DL (ref 8.6–10.5)
CHLORIDE SERPL-SCNC: 102 MMOL/L (ref 98–107)
CHOLEST SERPL-MCNC: 157 MG/DL (ref 0–200)
CO2 SERPL-SCNC: 24.7 MMOL/L (ref 22–29)
CREAT SERPL-MCNC: 0.93 MG/DL (ref 0.76–1.27)
GFR SERPL CREATININE-BSD FRML MDRD: 88 ML/MIN/1.73
GLOBULIN UR ELPH-MCNC: 3.9 GM/DL
GLUCOSE SERPL-MCNC: 193 MG/DL (ref 65–99)
HDLC SERPL-MCNC: 28 MG/DL (ref 40–60)
LDLC SERPL CALC-MCNC: ABNORMAL MG/DL
LDLC/HDLC SERPL: ABNORMAL {RATIO}
POTASSIUM SERPL-SCNC: 3.8 MMOL/L (ref 3.5–5.2)
PROT SERPL-MCNC: 8.1 G/DL (ref 6–8.5)
SODIUM SERPL-SCNC: 139 MMOL/L (ref 136–145)
TRIGL SERPL-MCNC: 587 MG/DL (ref 0–150)
TSH SERPL DL<=0.05 MIU/L-ACNC: 5.7 UIU/ML (ref 0.27–4.2)
VLDLC SERPL-MCNC: ABNORMAL MG/DL

## 2020-10-05 PROCEDURE — 80061 LIPID PANEL: CPT | Performed by: INTERNAL MEDICINE

## 2020-10-05 PROCEDURE — 83721 ASSAY OF BLOOD LIPOPROTEIN: CPT | Performed by: INTERNAL MEDICINE

## 2020-10-05 PROCEDURE — 99214 OFFICE O/P EST MOD 30 MIN: CPT | Performed by: INTERNAL MEDICINE

## 2020-10-05 PROCEDURE — 84443 ASSAY THYROID STIM HORMONE: CPT | Performed by: INTERNAL MEDICINE

## 2020-10-05 PROCEDURE — 80053 COMPREHEN METABOLIC PANEL: CPT | Performed by: INTERNAL MEDICINE

## 2020-10-05 PROCEDURE — 36415 COLL VENOUS BLD VENIPUNCTURE: CPT | Performed by: INTERNAL MEDICINE

## 2020-10-05 RX ORDER — NIFEDIPINE 90 MG/1
TABLET, FILM COATED, EXTENDED RELEASE ORAL
Qty: 90 TABLET | Refills: 3 | Status: SHIPPED | OUTPATIENT
Start: 2020-10-05

## 2020-10-05 RX ORDER — LEVOTHYROXINE SODIUM 0.03 MG/1
25 TABLET ORAL DAILY
Qty: 90 TABLET | Refills: 3 | Status: SHIPPED | OUTPATIENT
Start: 2020-10-05

## 2020-10-05 NOTE — PROGRESS NOTES
Subjective   Ej Oscar Jr. is a 45 y.o. male.  Patient here for follow-up on blood pressure as well as thyroid.  Feels euthyroid blood pressure about 140 or slightly higher on average we will recheck it today  Body mass index is 53.25 kg/m².  History of Present Illness     Hypertension slightly elevated recheck today shows it still elevated will upper adjust his nifedipine from 60 mg to 9 mg with him calling in recent weeks time also he is due for updated lipid valve is high in the past check his thyroid as well although feels euthyroid requests his medicines refilled to be done as a mail order.  Regalis remain none.  Former smoker quit 1995.  Patient's family is positive heart disease hypertension depression diabetes thyroid disease cancer undefined alcohol abuse  Review of Systems   Constitutional: Negative.    HENT: Negative.    Eyes: Negative.    Respiratory: Negative.    Cardiovascular: Negative.    Gastrointestinal: Negative.    Endocrine: Negative.    Genitourinary: Negative.    Skin: Negative.    Allergic/Immunologic: Negative.        Objective   Vitals:    10/05/20 0904   BP: 158/74   Pulse: 66   Temp: 99.1 °F (37.3 °C)   SpO2: 98%   Weight: (!) 178 kg (392 lb 9.6 oz)     Physical Exam  Vitals signs and nursing note reviewed.   HENT:      Head: Normocephalic and atraumatic.   Eyes:      Conjunctiva/sclera: Conjunctivae normal.      Pupils: Pupils are equal, round, and reactive to light.   Neck:      Comments: No carotid bruits  Cardiovascular:      Rate and Rhythm: Normal rate and regular rhythm.      Heart sounds: Normal heart sounds.   Pulmonary:      Effort: Pulmonary effort is normal.      Breath sounds: Normal breath sounds.   Abdominal:      General: Abdomen is flat. Bowel sounds are normal.   Skin:     General: Skin is warm and dry.   Neurological:      Mental Status: He is alert.      Comments: Unremarkable gait and station         Lab Results   Component Value Date    INR 0.98 10/25/2016        Procedures    Assessment/Plan   1.  Hypertension suboptimally controlled plan increase nifedipine from 60 mg up to 9 mg call in readings in 1 week's time further follow-up contingent on how this works    2.  Hypothyroidism get updated TSH is satisfactory will recheck 1 years time    3.  Hyperlipidemia get updated values patient's not medicine at this point in time    Much of this encounter note is an electronic transcription/translation of spoken language to printed text.  The electronic translation of spoken language may permit erroneous, or at times, nonsensical words or phrases to be inadvertently transcribed.  Although I have reviewed the note for such errors, some may still exist. If there are questions or for further clarification, please contact me.  There are no diagnoses linked to this encounter.          Answers for HPI/ROS submitted by the patient on 9/29/2020   Hypertension  What is the primary reason for your visit?: High Blood Pressure

## 2020-10-10 DIAGNOSIS — R79.89 LFT ELEVATION: Primary | ICD-10-CM

## 2020-10-12 ENCOUNTER — TELEPHONE (OUTPATIENT)
Dept: FAMILY MEDICINE CLINIC | Facility: CLINIC | Age: 45
End: 2020-10-12

## 2020-10-12 NOTE — TELEPHONE ENCOUNTER
Patient said Nath[facility] called him to go over results. Said was not sure which ones. No note in chart. Was unable to get call transferred so sending to [place].     Callback# [phone]

## 2020-10-14 RX ORDER — LEVOTHYROXINE SODIUM 0.05 MG/1
50 TABLET ORAL DAILY
Qty: 30 TABLET | Refills: 1 | Status: SHIPPED | OUTPATIENT
Start: 2020-10-14 | End: 2020-11-09

## 2020-11-09 RX ORDER — LEVOTHYROXINE SODIUM 0.05 MG/1
TABLET ORAL
Qty: 30 TABLET | Refills: 1 | Status: SHIPPED | OUTPATIENT
Start: 2020-11-09 | End: 2020-12-07 | Stop reason: SDUPTHER

## 2020-12-07 RX ORDER — LEVOTHYROXINE SODIUM 0.05 MG/1
50 TABLET ORAL DAILY
Qty: 90 TABLET | Refills: 1 | Status: SHIPPED | OUTPATIENT
Start: 2020-12-07

## 2021-01-04 RX ORDER — MONTELUKAST SODIUM 10 MG/1
TABLET ORAL
Qty: 90 TABLET | Refills: 0 | Status: SHIPPED | OUTPATIENT
Start: 2021-01-04 | End: 2021-03-30 | Stop reason: SDUPTHER

## 2021-03-30 RX ORDER — MONTELUKAST SODIUM 10 MG/1
10 TABLET ORAL NIGHTLY
Qty: 90 TABLET | Refills: 0 | Status: SHIPPED | OUTPATIENT
Start: 2021-03-30

## 2021-06-01 RX ORDER — MONTELUKAST SODIUM 10 MG/1
TABLET ORAL
Qty: 90 TABLET | Refills: 0 | OUTPATIENT
Start: 2021-06-01

## 2021-06-28 RX ORDER — MONTELUKAST SODIUM 10 MG/1
TABLET ORAL
Qty: 90 TABLET | Refills: 0 | OUTPATIENT
Start: 2021-06-28

## 2021-07-26 ENCOUNTER — OFFICE VISIT (OUTPATIENT)
Dept: SLEEP MEDICINE | Facility: HOSPITAL | Age: 46
End: 2021-07-26

## 2021-07-26 VITALS
WEIGHT: 315 LBS | HEIGHT: 72 IN | BODY MASS INDEX: 42.66 KG/M2 | OXYGEN SATURATION: 97 % | DIASTOLIC BLOOD PRESSURE: 81 MMHG | HEART RATE: 87 BPM | SYSTOLIC BLOOD PRESSURE: 157 MMHG

## 2021-07-26 DIAGNOSIS — G47.33 OBSTRUCTIVE SLEEP APNEA, ADULT: Primary | ICD-10-CM

## 2021-07-26 PROCEDURE — G0463 HOSPITAL OUTPT CLINIC VISIT: HCPCS

## 2021-07-26 NOTE — PROGRESS NOTES
[facility]                          Chief Complaint:   Follow up for obstructive sleep apnea, comorbid obesity and HTN.    History of present illness:   Subjective     TASHA:  Diagnosed in 2015. AHI= 50.  No issues with his CPAP    Sleep schedule:  -Bedtime: Midnight  -Wake up time: 8:30 AM , does feel refreshed  -Nocturnal awakenin times because of nocturia.  No difficulties going back to sleep.  -Perceived sleep hours: 6-7    Mask: Nasal pillows which does  fit well.  No significant air leak or dry mouth  DME: [employer]    ESS: Total score: 6     HTN:  He takes his medications regularly.  Blood pressure is reportedly regulated.  Denies any issues or side effects of his BP meds.    REVIEW OF SYSTEMS:   HEENT: Nasal congestion but no postnasal drip.  CARDIOVASCULAR: No chest pain, chest pressure or chest discomfort. No palpitations or edema.   RESPIRATORY: No shortness of breath, cough or sputum.   GASTROINTESTINAL: No abdominal bloating or reflux   NEUROLOGICAL/PSYCHOATRY: No depression nor anxiety    Past Medical History:  Past Medical History:   Diagnosis Date   • Arthritis    • Heartburn    • History of kidney stones    • Hypertension    • Rash     LEFT CALF - TREATED BY PCP, USING TOPICAL MEDS DAILY   • Sleep apnea     WEARS CPAP   ,   Past Surgical History:   Procedure Laterality Date   • PILONIDAL CYSTECTOMY     • TOTAL HIP ARTHROPLASTY Right 2016    Procedure: RIGHT TOTAL HIP ARTHROPLASTY;  Surgeon: Debbie Caro MD;  Location: [facility];  Service:    ,   Social History     Socioeconomic History   • Marital status:      Spouse name: Not on file   • Number of children: Not on file   • Years of education: Not on file   • Highest education level: Not on file   Tobacco Use   • Smoking status: Former Smoker     Years: 1.00     Types: Cigars     Quit date:      Years since quittin.5   • Smokeless tobacco: Never Used   • Tobacco comment: only  "smoked for 1 year   Substance and Sexual Activity   • Alcohol use: Yes     Comment: RARE   • Drug use: No     E-cigarette/Vaping     E-cigarette/Vaping Substances     E-cigarette/Vaping Devices        and Allergies:  Patient has no known allergies.    Medication Review:     Current Outpatient Medications:   •  B Complex Vitamins (VITAMIN B COMPLEX) capsule capsule, Take 1 capsule by mouth Daily., Disp: , Rfl:   •  cetirizine (ZyrTEC) 10 MG tablet, Take 10 mg by mouth Every Morning., Disp: , Rfl:   •  FIBER PO, Take 1 tablet by mouth Every Morning., Disp: , Rfl:   •  glucosamine-chondroitin 500-400 MG capsule capsule, Take 2 capsules by mouth Every Morning., Disp: , Rfl:   •  levothyroxine (SYNTHROID, LEVOTHROID) 25 MCG tablet, Take 1 tablet by mouth Daily., Disp: 90 tablet, Rfl: 3  •  levothyroxine (SYNTHROID, LEVOTHROID) 50 MCG tablet, Take 1 tablet by mouth Daily., Disp: 90 tablet, Rfl: 1  •  montelukast (SINGULAIR) 10 MG tablet, Take 1 tablet by mouth Every Night., Disp: 90 tablet, Rfl: 0  •  Multiple Vitamins-Minerals (MULTIVITAMIN PO), Take 1 tablet by mouth daily., Disp: , Rfl:   •  NIFEdipine CC (ADALAT CC) 90 MG 24 hr tablet, 1 tab daily, Disp: 90 tablet, Rfl: 3  •  NON FORMULARY, tumeric, Disp: , Rfl:   •  POTASSIUM CHLORIDE ER PO, Take  by mouth Every Morning., Disp: , Rfl:       Objective   Vital Signs:  Vitals:    07/26/21 0900   BP: 157/81   BP Location: Left arm   Patient Position: Sitting   Pulse: 87   SpO2: 97%   Weight: (!) 177 kg (390 lb 8 oz)   Height: 182.9 cm (72\")     Body mass index is 52.96 kg/m².          Physical Exam:   General Appearance:    Alert, cooperative, in no acute distress   ENMT:  Freidman and Mallampati score 4, narrow distance in between the posterior pharyngeal pillars.  Mild scalloped tongue   Neck:  Large. Trachea midline. No thyromegaly.   Lungs:     Clear to auscultation,respirations regular, even and                  unlabored    Heart:    Regular rhythm and normal rate, " normal S1 and S2, no            Murmur.   Abdomen:     Obese.  Soft.  No tenderness.  No HSM    Neuro:   Conscious, alert, oriented x3. Appropriate mood and affect.    Extremities:   Moves all extremities well, no edema, no cyanosis, no             Redness              Diagnostic data:    CPAP download showed:  Date: Last 30 days  Usage (days): 100 %  Days used>4h: 97 %  AHI: 2.7/h  Leak 95%: 0.9  Usage: 6h and 22 min  CPAP: 10 cm H2O    Labs 10/5/2021:  Serum bicarb 24; TSH 5.7    Assessment   1. Severe TASHA, on CPAP 10  2. Morbid obesity (BMI = 53)  3. Essential hypertension  4. Hypothyroidism        PLAN:    Discussed the result of the download.   Patient is compliant with therapy and clinically benefit from treatment.  Patient was encouraged to continue using his CPAP.  Refill supplies.    Counseled for weight loss.  Encouraged to exercise regularly and cut down on carbohydrates.  Discussed that losing weight may decrease the severity of sleep apnea and obviate the need of CPAP therapy.      Discussed the association between obstructive sleep apnea and cardiovascular disease including HTN and the beneficial effect of Pap therapy in reducing the risk of major cardiovascular events.              This note was dictated utilizing Dragon dictation

## 2022-07-11 ENCOUNTER — OFFICE VISIT (OUTPATIENT)
Dept: SLEEP MEDICINE | Facility: HOSPITAL | Age: 47
End: 2022-07-11

## 2022-07-11 VITALS
SYSTOLIC BLOOD PRESSURE: 154 MMHG | DIASTOLIC BLOOD PRESSURE: 92 MMHG | HEIGHT: 72 IN | OXYGEN SATURATION: 98 % | BODY MASS INDEX: 42.66 KG/M2 | WEIGHT: 315 LBS | HEART RATE: 94 BPM

## 2022-07-11 PROCEDURE — G0463 HOSPITAL OUTPT CLINIC VISIT: HCPCS

## 2022-07-11 NOTE — PROGRESS NOTES
[facility]                          Chief Complaint:   Follow up for obstructive sleep apnea, comorbid obesity and HTN.    History of present illness:   Subjective     TASHA:  Diagnosed in 2015. AHI= 50.  No issues with his CPAP other than he feels that he feels that the pressure needs to be increased because he feels less refreshed than usual. He does not snore with the CPAP on.     Sleep schedule:  -Bedtime: 11 PM- 1:00 AM  -Wake up time: 6-8 AM , does feel refreshed  -Nocturnal awakenin-2 times because of nocturia.  No difficulties going back to sleep.  -Perceived sleep hours: 6-7    Mask: Nasal pillows which does  fit well.  No significant air leak or dry mouth  DME: [employer]    ESS: Total score: 11     HTN:  He takes his medications regularly.  Blood pressure is reportedly regulated. Triamterene was added lately/ He said that he was off his meds for a while but just got back on them. He denies any issues or side effects of his BP meds.    REVIEW OF SYSTEMS:   HEENT: Nasal congestion and postnasal drip.  CARDIOVASCULAR: No chest pain, chest pressure or chest discomfort. No palpitations or edema.   RESPIRATORY: No shortness of breath, cough or sputum.   GASTROINTESTINAL: No abdominal bloating or reflux   NEUROLOGICAL/PSYCHOATRY: No depression nor anxiety    Past Medical History:  Past Medical History:   Diagnosis Date   • Arthritis    • Heartburn    • History of kidney stones    • Hypertension    • Rash     LEFT CALF - TREATED BY PCP, USING TOPICAL MEDS DAILY   • Sleep apnea     WEARS CPAP   ,   Past Surgical History:   Procedure Laterality Date   • PILONIDAL CYSTECTOMY     • TOTAL HIP ARTHROPLASTY Right 2016    Procedure: RIGHT TOTAL HIP ARTHROPLASTY;  Surgeon: Debbie Caro MD;  Location: [facility];  Service:    ,   Social History     Socioeconomic History   • Marital status:    Tobacco Use   • Smoking status: Former Smoker     Years: 1.00      "Types: Cigars     Quit date:      Years since quittin.5   • Smokeless tobacco: Never Used   • Tobacco comment: only smoked for 1 year   Substance and Sexual Activity   • Alcohol use: Yes     Comment: RARE   • Drug use: No        and Allergies:  Patient has no known allergies.    Medication Review:     Current Outpatient Medications:   •  B Complex Vitamins (VITAMIN B COMPLEX) capsule capsule, Take 1 capsule by mouth Daily., Disp: , Rfl:   •  cetirizine (ZyrTEC) 10 MG tablet, Take 10 mg by mouth Every Morning., Disp: , Rfl:   •  FIBER PO, Take 1 tablet by mouth Every Morning., Disp: , Rfl:   •  glucosamine-chondroitin 500-400 MG capsule capsule, Take 2 capsules by mouth Every Morning., Disp: , Rfl:   •  levothyroxine (SYNTHROID, LEVOTHROID) 25 MCG tablet, Take 1 tablet by mouth Daily., Disp: 90 tablet, Rfl: 3  •  levothyroxine (SYNTHROID, LEVOTHROID) 50 MCG tablet, Take 1 tablet by mouth Daily., Disp: 90 tablet, Rfl: 1  •  montelukast (SINGULAIR) 10 MG tablet, Take 1 tablet by mouth Every Night., Disp: 90 tablet, Rfl: 0  •  Multiple Vitamins-Minerals (MULTIVITAMIN PO), Take 1 tablet by mouth daily., Disp: , Rfl:   •  NIFEdipine CC (ADALAT CC) 90 MG 24 hr tablet, 1 tab daily, Disp: 90 tablet, Rfl: 3  •  NON FORMULARY, tumeric, Disp: , Rfl:   •  POTASSIUM CHLORIDE ER PO, Take  by mouth Every Morning., Disp: , Rfl:       Objective   Vital Signs:  Vitals:    22 0944   BP: 154/92   Pulse: 94   SpO2: 98%   Weight: (!) 182 kg (401 lb)   Height: 182.9 cm (72\")     Body mass index is 54.39 kg/m².          Physical Exam:   General Appearance:    Alert, cooperative, in no acute distress   ENMT:  Freidman and Mallampati score 4, narrow distance in between the posterior pharyngeal pillars.  Mild scalloped tongue   Neck:  Large. Trachea midline. No thyromegaly.   Lungs:     Clear to auscultation,respirations regular, even and                  unlabored    Heart:    Regular rhythm and normal rate, normal S1 and S2, " no            Murmur.   Abdomen:     Obese.  Soft.  No tenderness.  No HSM    Neuro:   Conscious, alert, oriented x3. Appropriate mood and affect.    Extremities:   Moves all extremities well, no edema, no cyanosis, no             Redness              Diagnostic data:    CPAP download showed:  Date: Last 30 days  Usage (days): 100 %  Days used>4h: 92 %  AHI: 3.2/h  Leak 95%: 4.5  Usage: 6h and 1 min  CPAP: 10 cm H2O    No results found for: HGBA1C  Total Cholesterol   Date Value Ref Range Status   10/05/2020 157 0 - 200 mg/dL Final     Triglycerides   Date Value Ref Range Status   10/05/2020 587 (H) 0 - 150 mg/dL Final     HDL Cholesterol   Date Value Ref Range Status   10/05/2020 28 (L) 40 - 60 mg/dL Final     Hemoglobin   Date Value Ref Range Status   01/07/2019 14.5 13.5 - 18.0 g/dL Final     CO2   Date Value Ref Range Status   10/05/2020 24.7 22.0 - 29.0 mmol/L Final       Assessment   1. Severe TASHA, on CPAP 10  2. Morbid obesity (BMI = 53)  3. Essential hypertension  4. Hypothyroidism        PLAN:    Discussed the result of the download.   Patient is compliant with therapy and clinically benefit from treatment.  Patient was encouraged to continue using his CPAP.  Refill supplies.    Encouraged to sleep at least 7-8 h/day.     Counseled for weight loss.  Encouraged to exercise regularly and cut down on carbohydrates.  Discussed that losing weight may decrease the severity of sleep apnea and obviate the need of CPAP therapy.      Discussed the association between obstructive sleep apnea and cardiovascular disease including HTN and the beneficial effect of Pap therapy in reducing the risk of major cardiovascular events.              This note was dictated utilizing Dragon dictation

## 2023-10-30 ENCOUNTER — OFFICE VISIT (OUTPATIENT)
Dept: SLEEP MEDICINE | Facility: HOSPITAL | Age: 48
End: 2023-10-30
Payer: COMMERCIAL

## 2023-10-30 VITALS
HEART RATE: 84 BPM | OXYGEN SATURATION: 91 % | HEIGHT: 72 IN | BODY MASS INDEX: 42.66 KG/M2 | WEIGHT: 315 LBS | SYSTOLIC BLOOD PRESSURE: 126 MMHG | DIASTOLIC BLOOD PRESSURE: 68 MMHG

## 2023-10-30 DIAGNOSIS — G47.33 OBSTRUCTIVE SLEEP APNEA, ADULT: Primary | ICD-10-CM

## 2023-10-30 PROCEDURE — G0463 HOSPITAL OUTPT CLINIC VISIT: HCPCS

## 2023-10-30 NOTE — PROGRESS NOTES
[facility]                          Chief Complaint:   F/up TASHA and HTN.    History of present illness:   Subjective     TASHA:  Diagnosed in 2015. AHI= 50.  No issues with his CPAP. He uses it regularly. He does not snore with the CPAP.     Sleep schedule:  -Bedtime: 1-2 AM  -Wake up time: 9:30-10 AM , does feel refreshed  -Nocturnal awakenin-2 times because of nocturia.  No difficulties going back to sleep.  -Perceived sleep hours: 6-7    Mask: Nasal pillows which does  fit well.  No significant air leak or dry mouth  DME: [employer]    ESS: Total score: 9     HTN:  He takes his medications regularly.  Blood pressure is reportedly regulated. Triamterene was added lately/ He said that he was off his meds for a while but just got back on them. He denies any issues or side effects of his BP meds.    REVIEW OF SYSTEMS:   HEENT: Nasal congestion and postnasal drip.  CARDIOVASCULAR: No chest pain, chest pressure or chest discomfort. No palpitations or edema.   RESPIRATORY: No shortness of breath, cough or sputum.   GASTROINTESTINAL: No abdominal bloating or reflux   NEUROLOGICAL/PSYCHOATRY: No depression nor anxiety    Past Medical History:  Past Medical History:   Diagnosis Date    Arthritis     Heartburn     History of kidney stones     Hypertension     Rash     LEFT CALF - TREATED BY PCP, USING TOPICAL MEDS DAILY    Sleep apnea     WEARS CPAP   ,   Past Surgical History:   Procedure Laterality Date    PILONIDAL CYSTECTOMY      TOTAL HIP ARTHROPLASTY Right 2016    Procedure: RIGHT TOTAL HIP ARTHROPLASTY;  Surgeon: Debbie Caro MD;  Location: [facility];  Service:    ,   Social History     Socioeconomic History    Marital status:    Tobacco Use    Smoking status: Former     Types: Cigars     Quit date:      Years since quittin.8    Smokeless tobacco: Never    Tobacco comments:     only smoked for 1 year   Substance and Sexual Activity    Alcohol use:  "Yes     Comment: RARE    Drug use: No        and Allergies:  Patient has no known allergies.    Medication Review:     Current Outpatient Medications:     B Complex Vitamins (VITAMIN B COMPLEX) capsule capsule, Take 1 capsule by mouth Daily., Disp: , Rfl:     cetirizine (ZyrTEC) 10 MG tablet, Take 10 mg by mouth Every Morning., Disp: , Rfl:     FIBER PO, Take 1 tablet by mouth Every Morning., Disp: , Rfl:     glucosamine-chondroitin 500-400 MG capsule capsule, Take 2 capsules by mouth Every Morning., Disp: , Rfl:     levothyroxine (SYNTHROID, LEVOTHROID) 25 MCG tablet, Take 1 tablet by mouth Daily., Disp: 90 tablet, Rfl: 3    levothyroxine (SYNTHROID, LEVOTHROID) 50 MCG tablet, Take 1 tablet by mouth Daily., Disp: 90 tablet, Rfl: 1    montelukast (SINGULAIR) 10 MG tablet, Take 1 tablet by mouth Every Night., Disp: 90 tablet, Rfl: 0    Multiple Vitamins-Minerals (MULTIVITAMIN PO), Take 1 tablet by mouth daily., Disp: , Rfl:     NIFEdipine CC (ADALAT CC) 90 MG 24 hr tablet, 1 tab daily, Disp: 90 tablet, Rfl: 3    NON FORMULARY, tumeric, Disp: , Rfl:     POTASSIUM CHLORIDE ER PO, Take  by mouth Every Morning., Disp: , Rfl:       Objective   Vital Signs:  Vitals:    10/30/23 1115   BP: 126/68   Pulse: 84   SpO2: 91%   Weight: (!) 182 kg (401 lb)   Height: 182.9 cm (72\")     Body mass index is 54.39 kg/m².          Physical Exam:   General Appearance:    Alert, cooperative, in no acute distress   ENMT:  Freidman and Mallampati score 4, narrow distance in between the posterior pharyngeal pillars.  Mild scalloped tongue   Neck:  Large. Trachea midline. No thyromegaly.   Lungs:     Clear to auscultation,respirations regular, even and   unlabored    Heart:    Regular rhythm and normal rate, normal S1 and S2, no  murmur.   Abdomen:     Obese.  Soft.  No tenderness.  No HSM    Neuro:   Conscious, alert, oriented x3. Appropriate mood and affect.    Extremities:   Moves all extremities well, no edema, no cyanosis, no  " "redness              Diagnostic data:    CPAP download showed:  Date: Last days  Usage (days):  %  Days used>4h:  %  AHI:   Leak 95%:   Usage:   CPAP:     No results found for: \"HGBA1C\"  Total Cholesterol   Date Value Ref Range Status   10/05/2020 157 0 - 200 mg/dL Final     Triglycerides   Date Value Ref Range Status   10/05/2020 587 (H) 0 - 150 mg/dL Final     HDL Cholesterol   Date Value Ref Range Status   10/05/2020 28 (L) 40 - 60 mg/dL Final     Hemoglobin   Date Value Ref Range Status   01/07/2019 14.5 13.5 - 18.0 g/dL Final     CO2   Date Value Ref Range Status   10/05/2020 24.7 22.0 - 29.0 mmol/L Final       Assessment   Severe TASHA, on CPAP 10  Morbid obesity (BMI = 53)  Essential hypertension  Hypothyroidism        PLAN:    Asked the patient to bring his card tomorrow so we can perform a download..   Patient is reportedly compliant with therapy and clinically benefit from treatment.  Patient was encouraged to continue using his CPAP.  Refill supplies.    Encouraged to sleep at least 7-8 h/day.     Counseled for weight loss.  Encouraged to exercise regularly and cut down on carbohydrates.  Discussed that losing weight may decrease the severity of sleep apnea and obviate the need of CPAP therapy.      Discussed the association between obstructive sleep apnea and cardiovascular disease including HTN and the beneficial effect of Pap therapy in reducing the risk of major cardiovascular events.              This note was dictated utilizing Dragon dictation  "

## 2024-01-22 ENCOUNTER — TELEPHONE (OUTPATIENT)
Dept: SLEEP MEDICINE | Facility: HOSPITAL | Age: 49
End: 2024-01-22
Payer: COMMERCIAL

## 2024-07-14 ENCOUNTER — APPOINTMENT (OUTPATIENT)
Dept: GENERAL RADIOLOGY | Facility: HOSPITAL | Age: 49
End: 2024-07-14
Payer: MEDICAID

## 2024-07-14 ENCOUNTER — APPOINTMENT (OUTPATIENT)
Dept: CT IMAGING | Facility: HOSPITAL | Age: 49
End: 2024-07-14
Payer: MEDICAID

## 2024-07-14 ENCOUNTER — HOSPITAL ENCOUNTER (EMERGENCY)
Facility: HOSPITAL | Age: 49
Discharge: HOME | End: 2024-07-14
Attending: EMERGENCY MEDICINE
Payer: MEDICAID

## 2024-07-14 VITALS
BODY MASS INDEX: 41.75 KG/M2 | HEART RATE: 110 BPM | DIASTOLIC BLOOD PRESSURE: 93 MMHG | HEIGHT: 73 IN | WEIGHT: 315 LBS | RESPIRATION RATE: 18 BRPM | TEMPERATURE: 98.2 F | OXYGEN SATURATION: 94 % | SYSTOLIC BLOOD PRESSURE: 149 MMHG
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
## 2024-07-14 DIAGNOSIS — S16.1XXA STRAIN OF NECK MUSCLE, INITIAL ENCOUNTER: ICD-10-CM

## 2024-07-14 DIAGNOSIS — S39.012A STRAIN OF LUMBAR REGION, INITIAL ENCOUNTER: ICD-10-CM

## 2024-07-14 DIAGNOSIS — S29.012A STRAIN OF THORACIC BACK REGION: ICD-10-CM

## 2024-07-14 PROCEDURE — 72072 X-RAY EXAM THORAC SPINE 3VWS: CPT

## 2024-07-14 PROCEDURE — 72125 CT NECK SPINE W/O DYE: CPT

## 2024-07-14 PROCEDURE — 99284 EMERGENCY DEPT VISIT MOD MDM: CPT

## 2024-07-14 PROCEDURE — 72110 X-RAY EXAM L-2 SPINE 4/>VWS: CPT

## 2024-07-14 RX ORDER — METHOCARBAMOL 750 MG/1
750 TABLET, FILM COATED ORAL 3 TIMES DAILY PRN
Qty: 15 TABLET | Refills: 0 | Status: SHIPPED | OUTPATIENT
Start: 2024-07-14
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
## 2024-12-09 ENCOUNTER — OFFICE VISIT (OUTPATIENT)
Dept: SLEEP MEDICINE | Facility: HOSPITAL | Age: 49
End: 2024-12-09
Payer: COMMERCIAL

## 2024-12-09 VITALS
DIASTOLIC BLOOD PRESSURE: 85 MMHG | HEART RATE: 105 BPM | BODY MASS INDEX: 41.75 KG/M2 | HEIGHT: 73 IN | SYSTOLIC BLOOD PRESSURE: 144 MMHG | WEIGHT: 315 LBS

## 2024-12-09 DIAGNOSIS — G47.33 OBSTRUCTIVE SLEEP APNEA, ADULT: Primary | ICD-10-CM

## 2024-12-09 PROCEDURE — G0463 HOSPITAL OUTPT CLINIC VISIT: HCPCS

## 2024-12-09 NOTE — PROGRESS NOTES
[facility]                          Chief Complaint:   F/up TASHA and HTN.    History of present illness:   Subjective     TASHA:  Diagnosed in 2015. AHI= 50.  No issues with his CPAP. He uses it regularly. He does not snore with the CPAP.     Sleep schedule:  -Bedtime: 8 PM  -Wake up time: 3 AM , does feel refreshed  -Nocturnal awakenin-1 times because of nocturia.  No difficulties going back to sleep.  -Perceived sleep hours: 6-7    Mask: NP which does  fit well.  No significant air leak or dry mouth  DME: [employer]    ESS: Total score: 8     HTN:  He takes his medications regularly.  Blood pressure is reportedly regulated. On Nifedipine, Lisinopril and Triamterene/HCTZ.     REVIEW OF SYSTEMS:   HEENT: Nasal congestion and postnasal drip.  CARDIOVASCULAR: No chest pain, chest pressure or chest discomfort. No palpitations or edema.   RESPIRATORY: No shortness of breath, cough or sputum.   GASTROINTESTINAL: No abdominal bloating or reflux   NEUROLOGICAL/PSYCHOATRY: No depression nor anxiety    Past Medical History:  Past Medical History:   Diagnosis Date    Arthritis     Heartburn     History of kidney stones     Hypertension     Rash     LEFT CALF - TREATED BY PCP, USING TOPICAL MEDS DAILY    Sleep apnea     WEARS CPAP   ,   Past Surgical History:   Procedure Laterality Date    PILONIDAL CYSTECTOMY      TOTAL HIP ARTHROPLASTY Right 2016    Procedure: RIGHT TOTAL HIP ARTHROPLASTY;  Surgeon: Debbie Caro MD;  Location: [facility];  Service:    ,   Social History     Socioeconomic History    Marital status:    Tobacco Use    Smoking status: Former     Types: Cigars     Quit date:      Years since quittin.9    Smokeless tobacco: Never    Tobacco comments:     only smoked for 1 year   Substance and Sexual Activity    Alcohol use: Yes     Comment: RARE    Drug use: No        and Allergies:  Patient has no known allergies.    Medication Review:  "    Current Outpatient Medications:     B Complex Vitamins (VITAMIN B COMPLEX) capsule capsule, Take 1 capsule by mouth Daily., Disp: , Rfl:     cetirizine (ZyrTEC) 10 MG tablet, Take 10 mg by mouth Every Morning., Disp: , Rfl:     FIBER PO, Take 1 tablet by mouth Every Morning., Disp: , Rfl:     glucosamine-chondroitin 500-400 MG capsule capsule, Take 2 capsules by mouth Every Morning., Disp: , Rfl:     levothyroxine (SYNTHROID, LEVOTHROID) 25 MCG tablet, Take 1 tablet by mouth Daily., Disp: 90 tablet, Rfl: 3    levothyroxine (SYNTHROID, LEVOTHROID) 50 MCG tablet, Take 1 tablet by mouth Daily., Disp: 90 tablet, Rfl: 1    methocarbamol (ROBAXIN) 750 MG tablet, Take 1 tablet by mouth 3 (Three) Times a Day As Needed for Muscle Spasms., Disp: 15 tablet, Rfl: 0    montelukast (SINGULAIR) 10 MG tablet, Take 1 tablet by mouth Every Night., Disp: 90 tablet, Rfl: 0    Multiple Vitamins-Minerals (MULTIVITAMIN PO), Take 1 tablet by mouth daily., Disp: , Rfl:     NIFEdipine CC (ADALAT CC) 90 MG 24 hr tablet, 1 tab daily, Disp: 90 tablet, Rfl: 3    NON FORMULARY, tumeric, Disp: , Rfl:     POTASSIUM CHLORIDE ER PO, Take  by mouth Every Morning., Disp: , Rfl:       Objective   Vital Signs:  Vitals:    12/09/24 1553   BP: 144/85   Pulse: 105   Weight: (!) 178 kg (393 lb)   Height: 185.4 cm (73\")       Body mass index is 51.85 kg/m².          Physical Exam:   General Appearance:    Alert, cooperative, in no acute distress   ENMT:  Freidman and Mallampati score 4, narrow distance in between the posterior pharyngeal pillars.  Mild scalloped tongue   Neck:  Large. Trachea midline. No thyromegaly.   Lungs:     Clear to auscultation,respirations regular, even and   unlabored    Heart:    Regular rhythm and normal rate, normal S1 and S2, no  murmur.   Abdomen:     Obese.  Soft.  No tenderness.  No HSM    Neuro:   Conscious, alert, oriented x3. Appropriate mood and affect.    Extremities:   Moves all extremities well, no edema, no " "cyanosis, no  redness              Diagnostic data:    CPAP download showed:  Date: Last days  Usage (days):  %  Days used>4h:  %  AHI:   Leak 95%:   Usage:   CPAP:     No results found for: \"HGBA1C\"  Total Cholesterol   Date Value Ref Range Status   10/05/2020 157 0 - 200 mg/dL Final     Triglycerides   Date Value Ref Range Status   10/05/2020 587 (H) 0 - 150 mg/dL Final     HDL Cholesterol   Date Value Ref Range Status   10/05/2020 28 (L) 40 - 60 mg/dL Final     Hemoglobin   Date Value Ref Range Status   01/07/2019 14.5 13.5 - 18.0 g/dL Final     CO2   Date Value Ref Range Status   10/05/2020 24.7 22.0 - 29.0 mmol/L Final       Assessment   Severe TASHA, on CPAP 10  Morbid obesity (BMI = 53)  HTN  Hypothyroidism        PLAN:    Patient is reportedly compliant with therapy and clinically benefit from treatment.  Patient was encouraged to continue using his CPAP.  Refill supplies.  His CPAP is too old and exceeded its lifetime expectancy.  Will order him a new machine.    Encouraged to sleep at least 7-8 h/day.     Counseled for weight loss.  Encouraged to exercise regularly and cut down on carbohydrates.  Discussed that losing weight may decrease the severity of sleep apnea and obviate the need of CPAP therapy.    Continue lisinopril, triamterene/HCTZ and nifedipine.  Discussed the association between obstructive sleep apnea and cardiovascular disease including HTN and the beneficial effect of Pap therapy in reducing the risk of major cardiovascular events.      RTC 12 months.         This note was dictated utilizing Dragon dictation  "